# Patient Record
Sex: FEMALE | Race: WHITE | NOT HISPANIC OR LATINO | Employment: FULL TIME | URBAN - METROPOLITAN AREA
[De-identification: names, ages, dates, MRNs, and addresses within clinical notes are randomized per-mention and may not be internally consistent; named-entity substitution may affect disease eponyms.]

---

## 2019-09-15 ENCOUNTER — HOSPITAL ENCOUNTER (EMERGENCY)
Facility: CLINIC | Age: 34
Discharge: HOME | End: 2019-09-16
Attending: EMERGENCY MEDICINE | Admitting: EMERGENCY MEDICINE
Payer: MEDICAID

## 2019-09-15 DIAGNOSIS — R51.9 NONINTRACTABLE HEADACHE, UNSPECIFIED CHRONICITY PATTERN, UNSPECIFIED HEADACHE TYPE: ICD-10-CM

## 2019-09-15 LAB
CREAT BLD-MCNC: 0.8 MG/DL (ref 0.52–1.04)
GFR SERPL CREATININE-BSD FRML MDRD: 83 ML/MIN/{1.73_M2}
HCG UR QL: NEGATIVE
INTERNAL QC OK POCT: YES

## 2019-09-15 PROCEDURE — 96361 HYDRATE IV INFUSION ADD-ON: CPT | Performed by: EMERGENCY MEDICINE

## 2019-09-15 PROCEDURE — 99284 EMERGENCY DEPT VISIT MOD MDM: CPT | Mod: Z6 | Performed by: EMERGENCY MEDICINE

## 2019-09-15 PROCEDURE — 85025 COMPLETE CBC W/AUTO DIFF WBC: CPT | Performed by: EMERGENCY MEDICINE

## 2019-09-15 PROCEDURE — 96374 THER/PROPH/DIAG INJ IV PUSH: CPT | Mod: 59 | Performed by: EMERGENCY MEDICINE

## 2019-09-15 PROCEDURE — 81025 URINE PREGNANCY TEST: CPT | Performed by: EMERGENCY MEDICINE

## 2019-09-15 PROCEDURE — 82565 ASSAY OF CREATININE: CPT

## 2019-09-15 PROCEDURE — 99284 EMERGENCY DEPT VISIT MOD MDM: CPT | Mod: 25 | Performed by: EMERGENCY MEDICINE

## 2019-09-15 PROCEDURE — 96375 TX/PRO/DX INJ NEW DRUG ADDON: CPT | Performed by: EMERGENCY MEDICINE

## 2019-09-15 PROCEDURE — 25000132 ZZH RX MED GY IP 250 OP 250 PS 637: Performed by: EMERGENCY MEDICINE

## 2019-09-15 PROCEDURE — 25000128 H RX IP 250 OP 636: Performed by: EMERGENCY MEDICINE

## 2019-09-15 RX ORDER — DIAZEPAM 5 MG
10 TABLET ORAL ONCE
Status: COMPLETED | OUTPATIENT
Start: 2019-09-15 | End: 2019-09-15

## 2019-09-15 RX ORDER — DULOXETIN HYDROCHLORIDE 30 MG/1
30 CAPSULE, DELAYED RELEASE ORAL 2 TIMES DAILY
COMMUNITY

## 2019-09-15 RX ORDER — MULTIVIT WITH MINERALS/LUTEIN
1000 TABLET ORAL DAILY
COMMUNITY

## 2019-09-15 RX ORDER — CYCLOBENZAPRINE HCL 10 MG
10 TABLET ORAL 3 TIMES DAILY PRN
COMMUNITY

## 2019-09-15 RX ORDER — LEVONORGESTREL AND ETHINYL ESTRADIOL 100-20(84)
1 KIT ORAL DAILY
COMMUNITY

## 2019-09-15 RX ORDER — CHOLECALCIFEROL (VITAMIN D3) 50 MCG
1 TABLET ORAL DAILY
COMMUNITY

## 2019-09-15 RX ORDER — GABAPENTIN 100 MG/1
100 CAPSULE ORAL 3 TIMES DAILY
COMMUNITY

## 2019-09-15 RX ORDER — METOCLOPRAMIDE HYDROCHLORIDE 5 MG/ML
10 INJECTION INTRAMUSCULAR; INTRAVENOUS ONCE
Status: COMPLETED | OUTPATIENT
Start: 2019-09-15 | End: 2019-09-15

## 2019-09-15 RX ORDER — DIPHENHYDRAMINE HYDROCHLORIDE 50 MG/ML
25 INJECTION INTRAMUSCULAR; INTRAVENOUS ONCE
Status: COMPLETED | OUTPATIENT
Start: 2019-09-15 | End: 2019-09-15

## 2019-09-15 RX ORDER — PROCHLORPERAZINE MALEATE 10 MG
10 TABLET ORAL EVERY 6 HOURS PRN
COMMUNITY

## 2019-09-15 RX ADMIN — DIAZEPAM 10 MG: 5 TABLET ORAL at 23:46

## 2019-09-15 RX ADMIN — DIPHENHYDRAMINE HYDROCHLORIDE 25 MG: 50 INJECTION, SOLUTION INTRAMUSCULAR; INTRAVENOUS at 23:46

## 2019-09-15 RX ADMIN — SODIUM CHLORIDE 1000 ML: 9 INJECTION, SOLUTION INTRAVENOUS at 23:46

## 2019-09-15 RX ADMIN — METOCLOPRAMIDE 10 MG: 5 INJECTION, SOLUTION INTRAMUSCULAR; INTRAVENOUS at 23:46

## 2019-09-15 SDOH — HEALTH STABILITY: MENTAL HEALTH: HOW OFTEN DO YOU HAVE A DRINK CONTAINING ALCOHOL?: NEVER

## 2019-09-15 ASSESSMENT — ENCOUNTER SYMPTOMS
PHOTOPHOBIA: 0
COUGH: 0
SHORTNESS OF BREATH: 0
FEVER: 0
NAUSEA: 0
HEADACHES: 1

## 2019-09-15 NOTE — ED AVS SNAPSHOT
[facility], [place], Emergency Department  [address] [place] [id number]  Phone:  [phone]                                    Madhuri Nowak   MRN: [id number]    Department:  [facility], Emergency Department   Date of Visit:  9/15/2019           After Visit Summary Signature Page    I have received my discharge instructions, and my questions have been answered. I have discussed any challenges I see with this plan with the nurse or doctor.    ..........................................................................................................................................  Patient/Patient Representative Signature      ..........................................................................................................................................  Patient Representative Print Name and Relationship to Patient    ..................................................               ................................................  Date                                   Time    ..........................................................................................................................................  Reviewed by Signature/Title    ...................................................              ..............................................  Date                                               Time          22EPIC Rev 08/18

## 2019-09-16 ENCOUNTER — APPOINTMENT (OUTPATIENT)
Dept: CT IMAGING | Facility: CLINIC | Age: 34
End: 2019-09-16
Attending: EMERGENCY MEDICINE
Payer: MEDICAID

## 2019-09-16 VITALS
WEIGHT: 133.3 LBS | RESPIRATION RATE: 18 BRPM | OXYGEN SATURATION: 100 % | HEART RATE: 97 BPM | SYSTOLIC BLOOD PRESSURE: 110 MMHG | DIASTOLIC BLOOD PRESSURE: 66 MMHG | TEMPERATURE: 97.9 F

## 2019-09-16 LAB
BASOPHILS # BLD AUTO: 0 10E9/L (ref 0–0.2)
BASOPHILS NFR BLD AUTO: 0 %
DIFFERENTIAL METHOD BLD: ABNORMAL
EOSINOPHIL # BLD AUTO: 0.3 10E9/L (ref 0–0.7)
EOSINOPHIL NFR BLD AUTO: 5.5 %
ERYTHROCYTE [DISTWIDTH] IN BLOOD BY AUTOMATED COUNT: 11.9 % (ref 10–15)
HCT VFR BLD AUTO: 37.1 % (ref 35–47)
HGB BLD-MCNC: 12.4 G/DL (ref 11.7–15.7)
LYMPHOCYTES # BLD AUTO: 3.5 10E9/L (ref 0.8–5.3)
LYMPHOCYTES NFR BLD AUTO: 56 %
MCH RBC QN AUTO: 31.6 PG (ref 26.5–33)
MCHC RBC AUTO-ENTMCNC: 33.4 G/DL (ref 31.5–36.5)
MCV RBC AUTO: 94 FL (ref 78–100)
MONOCYTES # BLD AUTO: 0.5 10E9/L (ref 0–1.3)
MONOCYTES NFR BLD AUTO: 7.3 %
MYELOCYTES # BLD: 0.1 10E9/L
MYELOCYTES NFR BLD MANUAL: 0.9 %
NEUTROPHILS # BLD AUTO: 1.9 10E9/L (ref 1.6–8.3)
NEUTROPHILS NFR BLD AUTO: 30.3 %
PLATELET # BLD AUTO: 239 10E9/L (ref 150–450)
PLATELET # BLD EST: ABNORMAL 10*3/UL
RBC # BLD AUTO: 3.93 10E12/L (ref 3.8–5.2)
RBC MORPH BLD: NORMAL
WBC # BLD AUTO: 6.3 10E9/L (ref 4–11)

## 2019-09-16 PROCEDURE — 70498 CT ANGIOGRAPHY NECK: CPT

## 2019-09-16 PROCEDURE — 25000128 H RX IP 250 OP 636: Performed by: EMERGENCY MEDICINE

## 2019-09-16 PROCEDURE — 25000125 ZZHC RX 250: Performed by: EMERGENCY MEDICINE

## 2019-09-16 RX ORDER — IOPAMIDOL 755 MG/ML
75 INJECTION, SOLUTION INTRAVASCULAR ONCE
Status: COMPLETED | OUTPATIENT
Start: 2019-09-16 | End: 2019-09-16

## 2019-09-16 RX ADMIN — IOPAMIDOL 75 ML: 755 INJECTION, SOLUTION INTRAVENOUS at 00:29

## 2019-09-16 RX ADMIN — SODIUM CHLORIDE 90 ML: 9 INJECTION, SOLUTION INTRAVENOUS at 00:29

## 2019-09-16 NOTE — ED PROVIDER NOTES
[place] EMERGENCY DEPARTMENT ([facility])  September 15, 2019    History     Chief Complaint   Patient presents with     Headache     HPI   Madhuri Nowak is a 34 year old female with reported history of migraine headaches who presents to the ED with 4 days of ongoing headache.  Per review of Care Everywhere, patient was seen in the ED yesterday at [facility] for same headache.  They treated her with Tylenol, Compazine, and Flexeril with improvement of her symptoms.  She was discharged with Compazine and Flexeril.      Patient states that since discharge she still has ongoing headache and states that it never went away.  She states that the pain medications given to her only dulled the pain but did not completely resolve her symptoms.  Patient states today she noted some new posterior neck swelling, so she called the nurse line. They advised her to come to the ED for further evaluation and treatment. She states her current headache is different from her migraine headaches in intensity and location. She states her current headache is much worse compared to migraine headaches and radiates from her neck and occipital area to the front of her head. She states her migraine heads usually stay in the occipital area. She does complain of some associated phonophobia and currently rates her headache 5/10. She otherwise denies nausea, photophobia, fever, cough, chest pain, shortness of breath. She states that this headache worsened all of a sudden yesterday.       PAST MEDICAL HISTORY  History reviewed. No pertinent past medical history.  PAST SURGICAL HISTORY  History reviewed. No pertinent surgical history.  FAMILY HISTORY  History reviewed. No pertinent family history.     SOCIAL HISTORY  Social History     Tobacco Use     Smoking status: Never Smoker   Substance Use Topics     Alcohol use: Never     Frequency: Never     MEDICATIONS  No current facility-administered medications for this encounter.      Current  Outpatient Medications   Medication     cyclobenzaprine (FLEXERIL) 10 MG tablet     DULoxetine (CYMBALTA) 30 MG capsule     gabapentin (NEURONTIN) 100 MG capsule     levonorgest-eth estrad 91-day (LOSEASONIQUE) 0.1-0.02 & 0.01 MG tablet     prochlorperazine (COMPAZINE) 10 MG tablet     vitamin B complex with vitamin C (VITAMIN  B COMPLEX) tablet     vitamin C (ASCORBIC ACID) 1000 MG TABS     vitamin D3 (CHOLECALCIFEROL) 2000 units (50 mcg) tablet     ALLERGIES  Allergies   Allergen Reactions     Norco [Hydrocodone-Acetaminophen] Itching       I have reviewed the Medications, Allergies, Past Medical and Surgical History, and Social History in the Epic system.    Review of Systems   Constitutional: Negative for fever.   Eyes: Negative for photophobia.   Respiratory: Negative for cough and shortness of breath.    Cardiovascular: Negative for chest pain.   Gastrointestinal: Negative for nausea.   Neurological: Positive for headaches (occipital radiates to the front).   All other systems reviewed and are negative.      Physical Exam   BP: 110/70  Pulse: 78  Heart Rate: 86  Temp: 97.9  F (36.6  C)  Resp: 18  Weight: 60.5 kg (133 lb 4.8 oz)  SpO2: 99 %      Physical Exam   Constitutional: She is oriented to person, place, and time. She appears well-developed and well-nourished. No distress.   HENT:   Head: Normocephalic and atraumatic.   Nose: Nose normal.   Mouth/Throat: Oropharynx is clear and moist.   Eyes: Pupils are equal, round, and reactive to light. Conjunctivae and EOM are normal. No scleral icterus.   Neck: Normal range of motion and phonation normal. Neck supple. Muscular tenderness present. No neck rigidity. No edema and no erythema present.       Tender over regional along C7 spinous process. Palpable muscle spasm in this region with reproducible tenderness. No rash, edema, warmth, crepitus.    Cardiovascular: Normal rate.   Pulmonary/Chest: Effort normal. No stridor. No respiratory distress.   Abdominal: She  exhibits no distension.   Musculoskeletal: Normal range of motion. She exhibits no deformity.   Neurological: She is alert and oriented to person, place, and time. She has normal strength. She displays no tremor. No cranial nerve deficit. She exhibits normal muscle tone. She displays no seizure activity. GCS eye subscore is 4. GCS verbal subscore is 5. GCS motor subscore is 6.   Skin: Skin is warm and dry. She is not diaphoretic.   Psychiatric: She has a normal mood and affect. Her behavior is normal.   Nursing note and vitals reviewed.      ED Course        Procedures   10:45 PM  The patient was seen and examined by Dr. Gallagher in Room 31.                Critical Care time:  none             Labs Ordered and Resulted from Time of ED Arrival Up to the Time of Departure from the ED   HCG QUAL URINE POCT - Normal   CBC WITH PLATELETS DIFFERENTIAL   CREATININE POCT   PERIPHERAL IV CATHETER   ISTAT CREATININE NURSING POCT           CTA Head Neck with Contrast   Preliminary Result   Impression:     1. Head CTA demonstrates no aneurysm or stenosis of the major   intracranial arteries.    2. Neck CTA demonstrates no stenosis of the major cervical arteries.    3. No intracranial hemorrhage on the noncontrast head CT.                 Assessments & Plan (with Medical Decision Making)   Madhuri Nowak is a 34 year old female with reported history of migraine headaches who presents to the ED with 4 days of ongoing headache.    Ddx: migraine HA, tension headache, muscle spasm, SAH, aneurysm    Patient re-presenting for headache that is atypical of her chronic migraines. No imaging was obtained yesterday. Will obtain CTA to r/o SAH since she is >6 hours out from onset of sudden HA pain. No e/o cellulitis or localized infection over C7 region where patient reports swelling. Likely adiposity and muscle spasm. No meningismus. Given valium, reglan, IVF, benadryl for pain.     CTA nl. Likely benign cause for headache and patient is safe  to continue conservative management for pain at home with message, NSAIDs, heating pad/ice, follow up with PCP.         I have reviewed the nursing notes.    I have reviewed the findings, diagnosis, plan and need for follow up with the patient.    New Prescriptions    No medications on file       Final diagnoses:   Nonintractable headache, unspecified chronicity pattern, unspecified headache type     I, Augustine Han, am serving as a trained medical scribe to document services personally performed by Mary Kay Gallagher MD, based on the provider's statements to me.      I, Mary Kay Gallagher MD, was physically present and have reviewed and verified the accuracy of this note documented by Augustine Han.    9/15/2019   [facility], [place], EMERGENCY DEPARTMENT     Mary Kay Gallagher MD  09/16/19 0019       Mary Kay Gallagher MD  09/16/19 0047

## 2019-09-16 NOTE — ED TRIAGE NOTES
Per [facility] report yesterday at [facility], patient reports headache for a few days. At 1400 yesterday, increased pain of head. Dx tension headache and muscle spasms. Patient reports back on neck pain shooting to top of head.  History of migraines

## 2019-09-16 NOTE — DISCHARGE INSTRUCTIONS
Please make an appointment to follow up with Your Primary Care Provider in 2-4 days as needed.    Return for worsening headache, confusion, fever, neck stiffness, recurrent vomiting, weakness or numbness of your face or extremities, slurred speech, blindness, or other signs of a stroke.

## 2021-06-16 PROBLEM — G43.109 MIGRAINE WITH AURA, NOT INTRACTABLE, WITHOUT STATUS MIGRAINOSUS: Status: ACTIVE | Noted: 2019-10-29

## 2021-06-16 PROBLEM — F41.9 ANXIETY: Status: ACTIVE | Noted: 2019-10-14

## 2021-10-28 ENCOUNTER — HOSPITAL ENCOUNTER (EMERGENCY)
Facility: HOSPITAL | Age: 36
Discharge: HOME | End: 2021-10-28
Payer: COMMERCIAL

## 2021-10-28 VITALS
TEMPERATURE: 98.3 F | RESPIRATION RATE: 16 BRPM | OXYGEN SATURATION: 99 % | SYSTOLIC BLOOD PRESSURE: 113 MMHG | HEIGHT: 61 IN | BODY MASS INDEX: 25.85 KG/M2 | WEIGHT: 136.91 LBS | DIASTOLIC BLOOD PRESSURE: 66 MMHG | HEART RATE: 96 BPM

## 2021-10-28 ASSESSMENT — MIFFLIN-ST. JEOR: SCORE: 1253.38

## 2021-10-29 ENCOUNTER — OFFICE VISIT (OUTPATIENT)
Dept: URGENT CARE | Facility: URGENT CARE | Age: 36
End: 2021-10-29
Payer: COMMERCIAL

## 2021-10-29 VITALS
TEMPERATURE: 98.1 F | DIASTOLIC BLOOD PRESSURE: 78 MMHG | WEIGHT: 134.6 LBS | HEART RATE: 101 BPM | BODY MASS INDEX: 25.43 KG/M2 | SYSTOLIC BLOOD PRESSURE: 118 MMHG | OXYGEN SATURATION: 100 %

## 2021-10-29 DIAGNOSIS — L02.31 LEFT BUTTOCK ABSCESS: Primary | ICD-10-CM

## 2021-10-29 PROCEDURE — 99203 OFFICE O/P NEW LOW 30 MIN: CPT | Performed by: PHYSICIAN ASSISTANT

## 2021-10-29 RX ORDER — TOPIRAMATE 25 MG/1
TABLET, FILM COATED ORAL
COMMUNITY
Start: 2021-10-18

## 2021-10-29 RX ORDER — DOCUSATE SODIUM 100 MG/1
100 CAPSULE, LIQUID FILLED ORAL
COMMUNITY

## 2021-10-29 RX ORDER — PRAMIPEXOLE DIHYDROCHLORIDE 0.75 MG/1
0.75 TABLET ORAL
COMMUNITY
Start: 2021-09-28

## 2021-10-29 RX ORDER — ONDANSETRON 4 MG/1
4 TABLET, ORALLY DISINTEGRATING ORAL
COMMUNITY
Start: 2020-11-16

## 2021-10-29 RX ORDER — RIZATRIPTAN BENZOATE 5 MG/1
TABLET ORAL
COMMUNITY
Start: 2021-10-09

## 2021-10-29 RX ORDER — ZOLPIDEM TARTRATE 10 MG/1
10 TABLET ORAL
COMMUNITY
Start: 2021-08-09

## 2021-10-29 RX ORDER — DICYCLOMINE HCL 20 MG
20 TABLET ORAL
COMMUNITY
Start: 2020-01-20

## 2021-10-29 RX ORDER — TIZANIDINE HYDROCHLORIDE 4 MG/1
CAPSULE, GELATIN COATED ORAL
COMMUNITY
Start: 2021-10-18

## 2021-10-29 NOTE — PROGRESS NOTES
Chief Complaint   Patient presents with     cyst     tailbone              ASSESSMENT:    ICD-10-CM    1. Left buttock abscess  L02.31 amoxicillin-clavulanate (AUGMENTIN) 875-125 MG tablet     Adult General Surg Referral           PLAN: Right buttock boil near the rectum.  Cannot rule out a tract that goes to the rectum at this point.  Has opened and drained.  Warm tub soaks, Augmentin.  Monitor temp.  If pain worsens or fever or chills develop over the weekend go to the ER.  Otherwise referral to see general surgery early next week.  See today's orders.    Advised about symptoms which might herald more serious problems.        Brianna Lal PA-C         SUBJECTIVE:  36-year-old female presents for tailbone boil for a few days.  A month ago she had a painful swelling in the same area that resolved on its own.  This 1 however did not resolve and it was very difficult to walk today.  However when she was getting in and out of the car it opened up and now she feels immediate relief of the pain.  No fever.             Allergies   Allergen Reactions     Norco [Hydrocodone-Acetaminophen] Itching       Past Medical History:   Diagnosis Date     Acute gastritis      Anxiety      Depression      IBS (irritable bowel syndrome)        cyclobenzaprine (FLEXERIL) 10 MG tablet, Take 10 mg by mouth 3 times daily as needed for muscle spasms (Patient not taking: Reported on 10/29/2021)  DULoxetine (CYMBALTA) 30 MG capsule, Take 30 mg by mouth 2 times daily (Patient not taking: Reported on 10/29/2021)  gabapentin (NEURONTIN) 100 MG capsule, Take 100 mg by mouth 3 times daily (Patient not taking: Reported on 10/29/2021)  levonorgest-eth estrad 91-day (LOSEASONIQUE) 0.1-0.02 & 0.01 MG tablet, Take 1 tablet by mouth daily (Patient not taking: Reported on 10/29/2021)  prochlorperazine (COMPAZINE) 10 MG tablet, Take 10 mg by mouth every 6 hours as needed for nausea or vomiting (Patient not taking: Reported on 10/29/2021)  vitamin B  complex with vitamin C (VITAMIN  B COMPLEX) tablet, Take 1 tablet by mouth daily (Patient not taking: Reported on 10/29/2021)  vitamin C (ASCORBIC ACID) 1000 MG TABS, Take 1,000 mg by mouth daily (Patient not taking: Reported on 10/29/2021)  vitamin D3 (CHOLECALCIFEROL) 2000 units (50 mcg) tablet, Take 1 tablet by mouth daily (Patient not taking: Reported on 10/29/2021)    No current facility-administered medications on file prior to visit.      Social History     Tobacco Use     Smoking status: Never Smoker     Smokeless tobacco: Never Used   Substance Use Topics     Alcohol use: Never     Drug use: Never       ROS:  General: negative for fever  SKIN: + as above    Physcial Exam:  LMP 10/21/2021   /78   Pulse 101   Temp 98.1  F (36.7  C) (Tympanic)   Wt 61.1 kg (134 lb 9.6 oz)   LMP 10/21/2021   SpO2 100%   BMI 25.43 kg/m      GENERAL: alert, no acute distress  EYES: conjunctival clear  RESP: Regular breathing rate  NEURO: awake .  SKIN: No obvious palpable lump.  However there is some yellow drainage, pinpoint opening and red skin 1 to 2 cm from the rectum at approximately 1-3 o'clock.  Prior to opening up it was probably the size of a grape per patient.  Do not think this is a pilonidal cyst as it is not in the gluteal cleft at the top of the buttocks but closer to the rectum.    Brianna Lal PA-C

## 2021-11-26 ENCOUNTER — OFFICE VISIT (OUTPATIENT)
Dept: URGENT CARE | Facility: URGENT CARE | Age: 36
End: 2021-11-26
Payer: COMMERCIAL

## 2021-11-26 VITALS
OXYGEN SATURATION: 100 % | DIASTOLIC BLOOD PRESSURE: 71 MMHG | HEART RATE: 83 BPM | TEMPERATURE: 96.9 F | RESPIRATION RATE: 16 BRPM | SYSTOLIC BLOOD PRESSURE: 117 MMHG | WEIGHT: 139.7 LBS | BODY MASS INDEX: 26.4 KG/M2

## 2021-11-26 DIAGNOSIS — T15.91XA EYE FOREIGN BODY, RIGHT, INITIAL ENCOUNTER: Primary | ICD-10-CM

## 2021-11-26 PROCEDURE — 99213 OFFICE O/P EST LOW 20 MIN: CPT | Mod: 25 | Performed by: PHYSICIAN ASSISTANT

## 2021-11-26 PROCEDURE — 65205 REMOVE FOREIGN BODY FROM EYE: CPT | Performed by: PHYSICIAN ASSISTANT

## 2021-11-26 RX ORDER — OFLOXACIN 3 MG/ML
1-2 SOLUTION/ DROPS OPHTHALMIC 4 TIMES DAILY
Qty: 5 ML | Refills: 0 | Status: SHIPPED | OUTPATIENT
Start: 2021-11-26 | End: 2021-12-03

## 2021-11-26 NOTE — PROGRESS NOTES
Chief Complaint   Patient presents with     Eye Problem     Dry wall in right eye about an hour ago. Having pain and itching in right eye.                 ASSESSMENT:     ICD-10-CM    1. Eye foreign body, right, initial encounter  T15.91XA ofloxacin (OCUFLOX) 0.3 % ophthalmic solution     Adult Eye Referral         PLAN: Sheet rock/drywall-foreign body removal from under right upper eyelid.  Poison control called.  Recommended copious irrigation and check for any abrasion.  Cool compresses to the eye.  Antibiotic eyedrop.  If eye feels worse after the numbing drops were off instructed to go to the ER over the weekend.  Otherwise recheck with optometry Monday.  Referral given.  Vision check here is normal.  I have discussed clinical findings with patient.  Side effects of medications discussed.  Symptomatic care is discussed.  I have discussed the possibility of  worsening symptoms and indication to RTC or go to the ER if they occur.  All questions are answered, patient indicates understanding of these issues and is in agreement with plan.   Patient care instructions are discussed/given at the end of visit.   Lots of rest and fluids.      Brianna Lal PA-C      SUBJECTIVE:  36-year-old female presents for right eye irritation and watering.  She was at home drilling the sheet rock to put up a TV mount and some of the sheet rock fell into her eye.  She does not wear contact lenses or glasses.  Thigh is very irritated as if there is foreign body in it.  Vision a little blurry.      Allergies   Allergen Reactions     Norco [Hydrocodone-Acetaminophen] Itching       Past Medical History:   Diagnosis Date     Acute gastritis      Anxiety      Depression      IBS (irritable bowel syndrome)        amoxicillin-clavulanate (AUGMENTIN) 875-125 MG tablet, Take 1 tablet by mouth 2 times daily  diclofenac (VOLTAREN) 1 % topical gel, APPLY 2 GRAMS EXTERNALLY TO THE AFFECTED AREA TWICE DAILY AS NEEDED  dicyclomine (BENTYL) 20  MG tablet, Take 20 mg by mouth  docusate sodium (DSS) 100 MG capsule, Take 100 mg by mouth  ondansetron (ZOFRAN-ODT) 4 MG ODT tab, Take 4 mg by mouth  pramipexole (MIRAPEX) 0.75 MG tablet, Take 0.75 mg by mouth  prochlorperazine (COMPAZINE) 10 MG tablet, Take 10 mg by mouth every 6 hours as needed for nausea or vomiting   rizatriptan (MAXALT) 5 MG tablet, TAKE 1 TABLET BY MOUTH EVERY 2 HOURS AS NEEDED FOR MIGRAINE. GIVE AT MINIMUM 2 HOURS APART. MAX DOSE 30MG PER DAY  tiZANidine (ZANAFLEX) 4 MG capsule, TAKE 1 CAPSULE BY MOUTH EVERY 6 HOURS  topiramate (TOPAMAX) 25 MG tablet,   zolpidem (AMBIEN) 10 MG tablet, Take 10 mg by mouth  cyclobenzaprine (FLEXERIL) 10 MG tablet, Take 10 mg by mouth 3 times daily as needed for muscle spasms (Patient not taking: Reported on 10/29/2021)  DULoxetine (CYMBALTA) 30 MG capsule, Take 30 mg by mouth 2 times daily (Patient not taking: Reported on 10/29/2021)  gabapentin (NEURONTIN) 100 MG capsule, Take 100 mg by mouth 3 times daily (Patient not taking: Reported on 10/29/2021)  levonorgest-eth estrad 91-day (LOSEASONIQUE) 0.1-0.02 & 0.01 MG tablet, Take 1 tablet by mouth daily (Patient not taking: Reported on 10/29/2021)  vitamin B complex with vitamin C (VITAMIN  B COMPLEX) tablet, Take 1 tablet by mouth daily (Patient not taking: Reported on 10/29/2021)  vitamin C (ASCORBIC ACID) 1000 MG TABS, Take 1,000 mg by mouth daily (Patient not taking: Reported on 10/29/2021)  vitamin D3 (CHOLECALCIFEROL) 2000 units (50 mcg) tablet, Take 1 tablet by mouth daily (Patient not taking: Reported on 10/29/2021)    No current facility-administered medications on file prior to visit.      Social History     Tobacco Use     Smoking status: Current Every Day Smoker     Packs/day: 0.25     Types: Cigarettes     Smokeless tobacco: Never Used   Substance Use Topics     Alcohol use: Never       ROS:  CONSTITUTIONAL: Negative for fatigue or fever.  EYES: As above   MUSCULOSKELETAL:  Negative for significant  muscle or joint pains.  NEUROLOGIC: Negative for headaches.  SKIN: Negative for rash.  PSYCH: Normal mentation for age.    OBJECTIVE:  /71 (BP Location: Left arm, Patient Position: Sitting, Cuff Size: Adult Regular)   Pulse 83   Temp 96.9  F (36.1  C) (Tympanic)   Resp 16   Wt 63.4 kg (139 lb 11.2 oz)   SpO2 100%   Breastfeeding No   BMI 26.40 kg/m    GENERAL APPEARANCE: Healthy, alert and no distress.  EYES:Conjunctiva/sclera with injection on the right.  Pupils equal reactive to light and accommodation.  EOMs intact.  Fluorescein dye and proparacaine placed.  Small pebble of sheet rock noted under the upper lid.  Removed with sterile cotton swab.  No uptake on the iris.  I copiously irrigated with sterile eyestrain.    Vision right eye 20/25, left eye 20/25.    Brianna Lal PA-C

## 2021-11-26 NOTE — NURSING NOTE
VISION   No corrective lenses  Tool used: Han   Right eye:        10/12.5 (20/25)  Left eye:          10/12.5 (20/25)  Visual Acuity: Colette Rapp CMA

## 2022-01-30 ENCOUNTER — HEALTH MAINTENANCE LETTER (OUTPATIENT)
Age: 37
End: 2022-01-30

## 2022-05-12 ENCOUNTER — OFFICE VISIT (OUTPATIENT)
Dept: URGENT CARE | Facility: URGENT CARE | Age: 37
End: 2022-05-12
Payer: COMMERCIAL

## 2022-05-12 VITALS
SYSTOLIC BLOOD PRESSURE: 123 MMHG | WEIGHT: 134.6 LBS | HEART RATE: 102 BPM | DIASTOLIC BLOOD PRESSURE: 80 MMHG | RESPIRATION RATE: 18 BRPM | OXYGEN SATURATION: 99 % | BODY MASS INDEX: 25.43 KG/M2 | TEMPERATURE: 98 F

## 2022-05-12 DIAGNOSIS — R07.0 THROAT PAIN: ICD-10-CM

## 2022-05-12 DIAGNOSIS — B34.9 VIRAL SYNDROME: Primary | ICD-10-CM

## 2022-05-12 DIAGNOSIS — Z20.822 SUSPECTED 2019 NOVEL CORONAVIRUS INFECTION: ICD-10-CM

## 2022-05-12 LAB
DEPRECATED S PYO AG THROAT QL EIA: NEGATIVE
GROUP A STREP BY PCR: NOT DETECTED

## 2022-05-12 PROCEDURE — U0005 INFEC AGEN DETEC AMPLI PROBE: HCPCS | Performed by: PHYSICIAN ASSISTANT

## 2022-05-12 PROCEDURE — 87651 STREP A DNA AMP PROBE: CPT | Performed by: PHYSICIAN ASSISTANT

## 2022-05-12 PROCEDURE — 99213 OFFICE O/P EST LOW 20 MIN: CPT | Mod: CS | Performed by: PHYSICIAN ASSISTANT

## 2022-05-12 PROCEDURE — U0003 INFECTIOUS AGENT DETECTION BY NUCLEIC ACID (DNA OR RNA); SEVERE ACUTE RESPIRATORY SYNDROME CORONAVIRUS 2 (SARS-COV-2) (CORONAVIRUS DISEASE [COVID-19]), AMPLIFIED PROBE TECHNIQUE, MAKING USE OF HIGH THROUGHPUT TECHNOLOGIES AS DESCRIBED BY CMS-2020-01-R: HCPCS | Performed by: PHYSICIAN ASSISTANT

## 2022-05-12 RX ORDER — DEXAMETHASONE SODIUM PHOSPHATE 4 MG/ML
10 VIAL (ML) INJECTION ONCE
Status: COMPLETED | OUTPATIENT
Start: 2022-05-12 | End: 2022-05-12

## 2022-05-12 RX ADMIN — Medication 10 MG: at 11:28

## 2022-05-12 ASSESSMENT — ENCOUNTER SYMPTOMS
EYES NEGATIVE: 1
PALPITATIONS: 0
BACK PAIN: 0
DIARRHEA: 0
SHORTNESS OF BREATH: 0
RHINORRHEA: 0
MUSCULOSKELETAL NEGATIVE: 1
NECK STIFFNESS: 0
COUGH: 0
ALLERGIC/IMMUNOLOGIC NEGATIVE: 1
MYALGIAS: 0
HEADACHES: 0
VOMITING: 0
WOUND: 0
NECK PAIN: 0
ENDOCRINE NEGATIVE: 1
FEVER: 0
LIGHT-HEADEDNESS: 0
CHILLS: 0
ARTHRALGIAS: 0
RESPIRATORY NEGATIVE: 1
DIZZINESS: 0
CARDIOVASCULAR NEGATIVE: 1
JOINT SWELLING: 0
NAUSEA: 0
SORE THROAT: 1
WEAKNESS: 0

## 2022-05-12 ASSESSMENT — PAIN SCALES - GENERAL: PAINLEVEL: SEVERE PAIN (7)

## 2022-05-12 NOTE — PROGRESS NOTES
Chief Complaint:     Chief Complaint   Patient presents with     Pharyngitis     Started last night       Results for orders placed or performed in visit on 05/12/22   Streptococcus A Rapid Screen w/Reflex to PCR - Clinic Collect     Status: Normal    Specimen: Throat; Swab   Result Value Ref Range    Group A Strep antigen Negative Negative       Medical Decision Making:    Vital signs reviewed by Elvin Burton PA-C  /80 (BP Location: Left arm, Patient Position: Sitting, Cuff Size: Adult Regular)   Pulse 102   Temp 98  F (36.7  C) (Tympanic)   Resp 18   Wt 61.1 kg (134 lb 9.6 oz)   SpO2 99%   BMI 25.43 kg/m      Differential Diagnosis:  URI Adult/Peds:  Pneumonia, Strep pharyngitis, Viral pharyngitis and Viral upper respiratory illness        ASSESSMENT    1. Viral syndrome    2. Throat pain    3. Suspected 2019 novel coronavirus infection        PLAN    Patient is in no acute distress.    Temp is 98 in clinic today, lung sounds were clear, and O2 sats at 99% on RA.    RST was negative.  We will call with PCR results only if positive.  COVID swab collected in clinic.  Patient given 10 Mg Decadron PO in clinic today.  Rest, Push fluids, vaporizer.  Ibuprofen and or Tylenol for any fever or body aches.  If symptoms worsen, recheck immediately otherwise follow up with your PCP in 1 week if symptoms are not improving.  Worrisome symptoms discussed with instructions to go to the ED.  Patient verbalized understanding and agreed with this plan.    Labs:    Results for orders placed or performed in visit on 05/12/22   Streptococcus A Rapid Screen w/Reflex to PCR - Clinic Collect     Status: Normal    Specimen: Throat; Swab   Result Value Ref Range    Group A Strep antigen Negative Negative        Vital signs reviewed by Elvin Burton PA-C  /80 (BP Location: Left arm, Patient Position: Sitting, Cuff Size: Adult Regular)   Pulse 102   Temp 98  F (36.7  C) (Tympanic)   Resp 18   Wt 61.1 kg (134 lb  9.6 oz)   SpO2 99%   BMI 25.43 kg/m      Current Meds      Current Outpatient Medications:      diclofenac (VOLTAREN) 1 % topical gel, APPLY 2 GRAMS EXTERNALLY TO THE AFFECTED AREA TWICE DAILY AS NEEDED, Disp: , Rfl:      dicyclomine (BENTYL) 20 MG tablet, Take 20 mg by mouth, Disp: , Rfl:      ondansetron (ZOFRAN-ODT) 4 MG ODT tab, Take 4 mg by mouth, Disp: , Rfl:      pramipexole (MIRAPEX) 0.75 MG tablet, Take 0.75 mg by mouth, Disp: , Rfl:      prochlorperazine (COMPAZINE) 10 MG tablet, Take 10 mg by mouth every 6 hours as needed for nausea or vomiting , Disp: , Rfl:      rizatriptan (MAXALT) 5 MG tablet, TAKE 1 TABLET BY MOUTH EVERY 2 HOURS AS NEEDED FOR MIGRAINE. GIVE AT MINIMUM 2 HOURS APART. MAX DOSE 30MG PER DAY, Disp: , Rfl:      tiZANidine (ZANAFLEX) 4 MG capsule, TAKE 1 CAPSULE BY MOUTH EVERY 6 HOURS, Disp: , Rfl:      topiramate (TOPAMAX) 25 MG tablet, , Disp: , Rfl:      amoxicillin-clavulanate (AUGMENTIN) 875-125 MG tablet, Take 1 tablet by mouth 2 times daily (Patient not taking: Reported on 5/12/2022), Disp: 20 tablet, Rfl: 0     cyclobenzaprine (FLEXERIL) 10 MG tablet, Take 10 mg by mouth 3 times daily as needed for muscle spasms (Patient not taking: Reported on 10/29/2021), Disp: , Rfl:      docusate sodium (COLACE) 100 MG capsule, Take 100 mg by mouth (Patient not taking: Reported on 5/12/2022), Disp: , Rfl:      DULoxetine (CYMBALTA) 30 MG capsule, Take 30 mg by mouth 2 times daily (Patient not taking: Reported on 10/29/2021), Disp: , Rfl:      gabapentin (NEURONTIN) 100 MG capsule, Take 100 mg by mouth 3 times daily (Patient not taking: Reported on 10/29/2021), Disp: , Rfl:      levonorgest-eth estrad 91-day (LOSEASONIQUE) 0.1-0.02 & 0.01 MG tablet, Take 1 tablet by mouth daily (Patient not taking: Reported on 10/29/2021), Disp: , Rfl:      vitamin B complex with vitamin C (VITAMIN  B COMPLEX) tablet, Take 1 tablet by mouth daily (Patient not taking: Reported on 10/29/2021), Disp: , Rfl:       vitamin C (ASCORBIC ACID) 1000 MG TABS, Take 1,000 mg by mouth daily (Patient not taking: Reported on 10/29/2021), Disp: , Rfl:      vitamin D3 (CHOLECALCIFEROL) 2000 units (50 mcg) tablet, Take 1 tablet by mouth daily (Patient not taking: Reported on 10/29/2021), Disp: , Rfl:      zolpidem (AMBIEN) 10 MG tablet, Take 10 mg by mouth (Patient not taking: Reported on 5/12/2022), Disp: , Rfl:   No current facility-administered medications for this visit.            SUBJECTIVE    HPI: Madhuri Linares is an 37 year old female who presents with facial pain/pressure and sore throat.  Symptoms began 1  days ago and has stable.  There is no shortness of breath, wheezing, chest pain, nausea, vomiting and dysuria.  Patient is eating and drinking well.  No fever, nausea, vomiting, or diarrhea.    Patient denies any recent travel or exposure to known COVID positive tested individual.      ROS:     Review of Systems   Constitutional: Negative for chills and fever.   HENT: Positive for sore throat. Negative for congestion, ear pain and rhinorrhea.    Eyes: Negative.    Respiratory: Negative.  Negative for cough and shortness of breath.    Cardiovascular: Negative.  Negative for chest pain and palpitations.   Gastrointestinal: Negative for diarrhea, nausea and vomiting.   Endocrine: Negative.    Genitourinary: Negative.    Musculoskeletal: Negative.  Negative for arthralgias, back pain, joint swelling, myalgias, neck pain and neck stiffness.   Skin: Negative.  Negative for rash and wound.   Allergic/Immunologic: Negative.  Negative for immunocompromised state.   Neurological: Negative for dizziness, weakness, light-headedness and headaches.         Family History   No family history on file.     Problem history  Patient Active Problem List   Diagnosis     Migraine with aura, not intractable, without status migrainosus     Anxiety        Allergies  Allergies   Allergen Reactions     Norco [Hydrocodone-Acetaminophen] Itching      Seasonal Allergies         Social History  Social History     Socioeconomic History     Marital status: Single     Spouse name: Not on file     Number of children: Not on file     Years of education: Not on file     Highest education level: Not on file   Occupational History     Not on file   Tobacco Use     Smoking status: Current Every Day Smoker     Packs/day: 0.25     Types: Cigarettes     Smokeless tobacco: Never Used   Substance and Sexual Activity     Alcohol use: Never     Drug use: Never     Sexual activity: Not on file   Other Topics Concern     Not on file   Social History Narrative     Not on file     Social Determinants of Health     Financial Resource Strain: Not on file   Food Insecurity: Not on file   Transportation Needs: Not on file   Physical Activity: Not on file   Stress: Not on file   Social Connections: Not on file   Intimate Partner Violence: Not on file   Housing Stability: Not on file        OBJECTIVE     Vital signs reviewed by Elvin Burton PA-C  /80 (BP Location: Left arm, Patient Position: Sitting, Cuff Size: Adult Regular)   Pulse 102   Temp 98  F (36.7  C) (Tympanic)   Resp 18   Wt 61.1 kg (134 lb 9.6 oz)   SpO2 99%   BMI 25.43 kg/m       Physical Exam  Vitals and nursing note reviewed.   Constitutional:       General: She is not in acute distress.     Appearance: She is well-developed. She is not ill-appearing, toxic-appearing or diaphoretic.   HENT:      Head: Normocephalic and atraumatic.      Right Ear: Hearing, tympanic membrane, ear canal and external ear normal. Tympanic membrane is not perforated, erythematous, retracted or bulging.      Left Ear: Hearing, tympanic membrane, ear canal and external ear normal. Tympanic membrane is not perforated, erythematous, retracted or bulging.      Nose: Congestion present. No mucosal edema or rhinorrhea.      Right Sinus: No maxillary sinus tenderness or frontal sinus tenderness.      Left Sinus: No maxillary sinus  tenderness or frontal sinus tenderness.      Mouth/Throat:      Pharynx: Posterior oropharyngeal erythema present. No pharyngeal swelling, oropharyngeal exudate or uvula swelling.      Tonsils: No tonsillar exudate or tonsillar abscesses. 0 on the right. 0 on the left.   Eyes:      General:         Right eye: No discharge.         Left eye: No discharge.      Pupils: Pupils are equal, round, and reactive to light.   Cardiovascular:      Rate and Rhythm: Normal rate and regular rhythm.      Heart sounds: Normal heart sounds. No murmur heard.    No friction rub. No gallop.   Pulmonary:      Effort: Pulmonary effort is normal. No respiratory distress.      Breath sounds: Normal breath sounds. No decreased breath sounds, wheezing, rhonchi or rales.   Chest:      Chest wall: No tenderness.   Abdominal:      General: Bowel sounds are normal. There is no distension.      Palpations: Abdomen is soft. There is no mass.      Tenderness: There is no abdominal tenderness. There is no guarding.   Musculoskeletal:      Cervical back: Normal range of motion and neck supple.   Lymphadenopathy:      Head:      Right side of head: No submental, submandibular, tonsillar, preauricular or posterior auricular adenopathy.      Left side of head: No submental, submandibular, tonsillar, preauricular or posterior auricular adenopathy.      Cervical: No cervical adenopathy.      Right cervical: No superficial or posterior cervical adenopathy.     Left cervical: No superficial or posterior cervical adenopathy.   Skin:     General: Skin is warm and dry.      Findings: No rash.   Neurological:      Mental Status: She is alert and oriented to person, place, and time.      Cranial Nerves: No cranial nerve deficit.      Deep Tendon Reflexes: Reflexes are normal and symmetric.   Psychiatric:         Behavior: Behavior normal. Behavior is cooperative.         Thought Content: Thought content normal.         Judgment: Judgment normal.           Elvin  SERGEY Burton PA-C  5/12/2022, 10:58 AM

## 2022-05-12 NOTE — NURSING NOTE
Clinic Administered Medication Documentation    Oral Medication Documentation    Patient was given decadron. Prior to medication administration, verified patients identity using patient s name and date of birth. Please see MAR and medication order for additional information.     Was entire amount of medication used? Yes  Expiration Date: FEB 2023    César Willard CMA

## 2022-05-13 LAB — SARS-COV-2 RNA RESP QL NAA+PROBE: NEGATIVE

## 2022-07-15 ENCOUNTER — OFFICE VISIT (OUTPATIENT)
Dept: URGENT CARE | Facility: URGENT CARE | Age: 37
End: 2022-07-15
Payer: COMMERCIAL

## 2022-07-15 VITALS
SYSTOLIC BLOOD PRESSURE: 114 MMHG | DIASTOLIC BLOOD PRESSURE: 70 MMHG | TEMPERATURE: 97.5 F | WEIGHT: 141.9 LBS | HEIGHT: 62 IN | HEART RATE: 77 BPM | OXYGEN SATURATION: 98 % | BODY MASS INDEX: 26.11 KG/M2

## 2022-07-15 DIAGNOSIS — R68.89 FLU-LIKE SYMPTOMS: ICD-10-CM

## 2022-07-15 DIAGNOSIS — Z20.822 EXPOSURE TO 2019 NOVEL CORONAVIRUS: ICD-10-CM

## 2022-07-15 DIAGNOSIS — J06.9 UPPER RESPIRATORY TRACT INFECTION, UNSPECIFIED TYPE: Primary | ICD-10-CM

## 2022-07-15 DIAGNOSIS — J02.9 SORE THROAT: ICD-10-CM

## 2022-07-15 LAB
DEPRECATED S PYO AG THROAT QL EIA: NEGATIVE
FLUAV AG SPEC QL IA: NEGATIVE
FLUBV AG SPEC QL IA: NEGATIVE
GROUP A STREP BY PCR: NOT DETECTED
SARS-COV-2 RNA RESP QL NAA+PROBE: POSITIVE

## 2022-07-15 PROCEDURE — U0005 INFEC AGEN DETEC AMPLI PROBE: HCPCS | Performed by: PHYSICIAN ASSISTANT

## 2022-07-15 PROCEDURE — 87804 INFLUENZA ASSAY W/OPTIC: CPT | Performed by: PHYSICIAN ASSISTANT

## 2022-07-15 PROCEDURE — 87651 STREP A DNA AMP PROBE: CPT | Performed by: PHYSICIAN ASSISTANT

## 2022-07-15 PROCEDURE — 99213 OFFICE O/P EST LOW 20 MIN: CPT | Performed by: PHYSICIAN ASSISTANT

## 2022-07-15 PROCEDURE — U0003 INFECTIOUS AGENT DETECTION BY NUCLEIC ACID (DNA OR RNA); SEVERE ACUTE RESPIRATORY SYNDROME CORONAVIRUS 2 (SARS-COV-2) (CORONAVIRUS DISEASE [COVID-19]), AMPLIFIED PROBE TECHNIQUE, MAKING USE OF HIGH THROUGHPUT TECHNOLOGIES AS DESCRIBED BY CMS-2020-01-R: HCPCS | Performed by: PHYSICIAN ASSISTANT

## 2022-07-15 NOTE — LETTER
[facility] [address] [address]  Phone: [phone]    July 15, 2022        Madhuri Linares  [address]  [facility] [address]          To whom it may concern:    RE: Madhuri Linares    Patient was seen and treated today at our clinic and missed work. Please excuse today.    Please contact me for questions or concerns.      Sincerely,        Brianna Lal PA-C

## 2022-07-15 NOTE — PROGRESS NOTES
Chief Complaint   Patient presents with     Sinus Problem     Nasal Congestion     Pharyngitis     Running Nose       Results for orders placed or performed in visit on 07/15/22   Streptococcus A Rapid Screen w/Reflex to PCR - Clinic Collect     Status: Normal    Specimen: Throat; Swab   Result Value Ref Range    Group A Strep antigen Negative Negative   Influenza A & B Antigen - Clinic Collect     Status: Normal    Specimen: Nose; Swab   Result Value Ref Range    Influenza A antigen Negative Negative    Influenza B antigen Negative Negative    Narrative    Test results must be correlated with clinical data. If necessary, results should be confirmed by a molecular assay or viral culture.               ASSESSMENT:     ICD-10-CM    1. Upper respiratory tract infection, unspecified type  J06.9    2. Sore throat  J02.9 Streptococcus A Rapid Screen w/Reflex to PCR - Clinic Collect     Group A Streptococcus PCR Throat Swab   3. Exposure to 2019 novel coronavirus  Z20.822 Symptomatic; Unknown COVID-19 Virus (Coronavirus) by PCR Nose   4. Flu-like symptoms  R68.89 Influenza A & B Antigen - Clinic Collect           PLAN:Work slip to excuse from work today given.  I have discussed clinical findings with patient.  Side effects of medications discussed.  Symptomatic care is discussed.  I have discussed the possibility of  worsening symptoms and indication to RTC or go to the ER if they occur.  All questions are answered, patient indicates understanding of these issues and is in agreement with plan.   Patient care instructions are discussed/given at the end of visit.   Lots of rest and fluids.      Brianna Lal PA-C      SUBJECTIVE: 37-year-old female woke up with sinus congestion, throat irritation, sinus pressure this morning. No fver. No V/D.      covid vacc x3    Allergies   Allergen Reactions     Norco [Hydrocodone-Acetaminophen] Itching     Seasonal Allergies        Past Medical History:   Diagnosis Date     Acute  gastritis      Anxiety      Depression      IBS (irritable bowel syndrome)        diclofenac (VOLTAREN) 1 % topical gel, APPLY 2 GRAMS EXTERNALLY TO THE AFFECTED AREA TWICE DAILY AS NEEDED  ondansetron (ZOFRAN-ODT) 4 MG ODT tab, Take 4 mg by mouth  rizatriptan (MAXALT) 5 MG tablet, TAKE 1 TABLET BY MOUTH EVERY 2 HOURS AS NEEDED FOR MIGRAINE. GIVE AT MINIMUM 2 HOURS APART. MAX DOSE 30MG PER DAY  tiZANidine (ZANAFLEX) 4 MG capsule, TAKE 1 CAPSULE BY MOUTH EVERY 6 HOURS  topiramate (TOPAMAX) 25 MG tablet,   amoxicillin-clavulanate (AUGMENTIN) 875-125 MG tablet, Take 1 tablet by mouth 2 times daily (Patient not taking: No sig reported)  cyclobenzaprine (FLEXERIL) 10 MG tablet, Take 10 mg by mouth 3 times daily as needed for muscle spasms (Patient not taking: No sig reported)  dicyclomine (BENTYL) 20 MG tablet, Take 20 mg by mouth (Patient not taking: Reported on 7/15/2022)  docusate sodium (COLACE) 100 MG capsule, Take 100 mg by mouth (Patient not taking: No sig reported)  DULoxetine (CYMBALTA) 30 MG capsule, Take 30 mg by mouth 2 times daily (Patient not taking: No sig reported)  gabapentin (NEURONTIN) 100 MG capsule, Take 100 mg by mouth 3 times daily (Patient not taking: No sig reported)  levonorgest-eth estrad 91-day (LOSEASONIQUE) 0.1-0.02 & 0.01 MG tablet, Take 1 tablet by mouth daily (Patient not taking: No sig reported)  pramipexole (MIRAPEX) 0.75 MG tablet, Take 0.75 mg by mouth (Patient not taking: Reported on 7/15/2022)  prochlorperazine (COMPAZINE) 10 MG tablet, Take 10 mg by mouth every 6 hours as needed for nausea or vomiting  (Patient not taking: Reported on 7/15/2022)  vitamin B complex with vitamin C (STRESS TAB) tablet, Take 1 tablet by mouth daily (Patient not taking: No sig reported)  vitamin C (ASCORBIC ACID) 1000 MG TABS, Take 1,000 mg by mouth daily (Patient not taking: No sig reported)  vitamin D3 (CHOLECALCIFEROL) 2000 units (50 mcg) tablet, Take 1 tablet by mouth daily (Patient not taking: No  "sig reported)  zolpidem (AMBIEN) 10 MG tablet, Take 10 mg by mouth (Patient not taking: No sig reported)    No current facility-administered medications on file prior to visit.      Social History     Tobacco Use     Smoking status: Current Every Day Smoker     Packs/day: 0.25     Types: Cigarettes     Smokeless tobacco: Never Used   Substance Use Topics     Alcohol use: Never       ROS:  CONSTITUTIONAL: Negative for fatigue or fever.  EYES: Negative for eye problems.  ENT: As above.  RESP: As above.  CV: Negative for chest pains.  GI: Negative for vomiting.  MUSCULOSKELETAL:  Negative for significant muscle or joint pains.  NEUROLOGIC: Negative for headaches.  SKIN: Negative for rash.  PSYCH: Normal mentation for age.    OBJECTIVE:  /70 (BP Location: Left arm, Patient Position: Sitting, Cuff Size: Adult Regular)   Pulse 77   Temp 97.5  F (36.4  C) (Tympanic)   Ht 1.575 m (5' 2\")   Wt 64.4 kg (141 lb 14.4 oz)   SpO2 98%   BMI 25.95 kg/m    GENERAL APPEARANCE: Healthy, alert and no distress.  EYES:Conjunctiva/sclera clear.  EARS: No cerumen.   Ear canals w/o erythema.  TM's intact w/o erythema.    NOSE/MOUTH: Nose without ulcers, erythema or lesions.  SINUSES: No maxillary sinus tenderness.  THROAT: No erythema w/o tonsillar enlargement . No exudates.  NECK: Supple, nontender, no lymphadenopathy.  RESP: Lungs clear to auscultation - no rales, rhonchi or wheezes  CV: Regular rate and rhythm, normal S1 S2, no murmur noted.  NEURO: Awake, alert    SKIN: No rashes      Brianna Lal PA-C      "

## 2022-07-19 ENCOUNTER — TELEPHONE (OUTPATIENT)
Dept: NURSING | Facility: CLINIC | Age: 37
End: 2022-07-19

## 2022-07-19 NOTE — TELEPHONE ENCOUNTER
Patient classified as COVID treatment eligible by Epic high risk algorithm:  No    Coronavirus (COVID-19) Notification    Reason for call  Notify of POSITIVE COVID-19 lab result, assess symptoms,  review [facility] recommendations    Lab Result   Lab test for 2019-nCoV rRt-PCR or SARS-COV-2 PCR  Oropharyngeal AND/OR nasopharyngeal swabs were POSITIVE for 2019-nCoV RNA [OR] SARS-COV-2 RNA (COVID-19) RNA     We have been unable to reach patient by phone at this time to notify of their Positive COVID-19 result.    Unable to LVM--not set up      A Positive COVID-19 letter will be sent via [employer] or the mail.    Crystal Guerrero

## 2022-09-18 ENCOUNTER — HEALTH MAINTENANCE LETTER (OUTPATIENT)
Age: 37
End: 2022-09-18

## 2023-06-04 ENCOUNTER — HEALTH MAINTENANCE LETTER (OUTPATIENT)
Age: 38
End: 2023-06-04

## 2024-07-14 ENCOUNTER — HEALTH MAINTENANCE LETTER (OUTPATIENT)
Age: 39
End: 2024-07-14